# Patient Record
Sex: MALE | Race: WHITE | Employment: UNEMPLOYED | URBAN - METROPOLITAN AREA
[De-identification: names, ages, dates, MRNs, and addresses within clinical notes are randomized per-mention and may not be internally consistent; named-entity substitution may affect disease eponyms.]

---

## 2017-10-23 ENCOUNTER — HOSPITAL ENCOUNTER (OUTPATIENT)
Dept: PHYSICAL THERAPY | Age: 67
Discharge: HOME | End: 2017-10-23

## 2017-11-03 ENCOUNTER — HOSPITAL ENCOUNTER (OUTPATIENT)
Dept: PHYSICAL THERAPY | Age: 67
Discharge: HOME | End: 2017-11-03
Payer: MEDICARE

## 2017-11-03 PROCEDURE — 97162 PT EVAL MOD COMPLEX 30 MIN: CPT

## 2017-11-03 PROCEDURE — G8979 MOBILITY GOAL STATUS: HCPCS

## 2017-11-03 PROCEDURE — G8978 MOBILITY CURRENT STATUS: HCPCS

## 2017-11-03 NOTE — PROGRESS NOTES
Elias Fitzgerald  : 1950 [facility] [place] [id number] [facility], [address], [place] [address].  [phone]   [phone]          OUTPATIENT ORTHOPAEDIC PHYSICAL THERAPY    NAME/AGE/GENDER: Elias Fitzgerald is a 73 y.o. male    DATE: 11/3/2017                       Ambulatory/Rehab Services H2 Model Falls Risk Assessment    Risk Factor Pts. ·   Confusion/Disorientation/Impulsivity  []    4 ·   Symptomatic Depression  []   2 ·   Altered Elimination  []   1 ·   Dizziness/Vertigo  []   1 ·   Gender (Male)  []   1 ·   Any administered antiepileptics (anticonvulsants):  []   2 ·   Any administered benzodiazepines:  []   1 ·   Visual Impairment (specify):  []   1 ·   Portable Oxygen Use  []   1 ·   Orthostatic ? BP  []   1 ·   History of Recent Falls (within 3 mos.)  []   5     Ability to Rise from Chair (choose one) Pts. ·   Ability to rise in a single movement  []   0 ·   Pushes up, successful in one attempt  []   1 ·   Multiple attempts, but unsuccessful  [x]   3 ·   Unable to rise without assistance  []   4   Total: (5 or greater = High Risk) 3     Falls Prevention Plan:   []                Physical Limitations to Exercise (specify):   []                Mobility Assistance Device (type):   []                Exercise/Equipment Adaptation (specify):    © [facility] [address] [place] [address] States Patent #[id number].  Federal Law prohibits the replication, distribution or use without written permission from [facility], [address] [place] [address]

## 2017-11-03 NOTE — THERAPY EVALUATION
Yg Maffucci  : 1950  [place] / Product Type: Medicare /  [address]  at [place] [address], [facility].  [phone]   [phone]          OUTPATIENT PHYSICAL THERAPY:Initial Assessment 11/3/2017    ICD-10: Treatment Diagnosis:   · Other abnormalities of gait and mobility (R26.89)  · Difficulty in walking, not elsewhere classified (R26.2)  Precautions/Allergies:   Lyrica [pregabalin]; Pcn [penicillins]; and Sulfa (sulfonamide antibiotics)   Fall Risk Score: 3 (? 5 = High Risk)  MD Orders: Evaluate and Treat MEDICAL/REFERRING DIAGNOSIS:  Personal history of transient ischemic attack (TIA), and cerebral infarction without residual deficits [Z86.73]  Other malaise [R53.81]   DATE OF ONSET: CVA:   REFERRING PHYSICIAN: Gypsy Bell MD  RETURN PHYSICIAN APPOINTMENT: TBD     INITIAL ASSESSMENT:  Mr. Lynsey Brizuela presents with decreased mobility, wrist pain, and difficulty with independent mobility secondary to CVA and degenerative joint changes. After discussing with patient, he agreed he would benefit from elevated wheelchair cushion, wrist brace, and changes in positioning within wheelchair to ease transfers. Also discussed with patient and his caregiver about updated power chair tools/settings including seat tilt and elevation to assist with independence with transfers and decrease pressure exerted through left wrist.  Patient agreed to attempt changes for the next 2 weeks and then to return to physical therapy if needed. Please sign this plan of treatment if you concur. Thank you for the opportunity to serve this patient. PROBLEM LIST (Impacting functional limitations):  1. Decreased ADL/Functional Activities  2. Increased Pain  3. Decreased Activity Tolerance INTERVENTIONS PLANNED:  1. Cold  2. Family Education  3. Home Exercise Program (HEP)  4.  Therapeutic Activites   TREATMENT PLAN:  Effective Dates: 11/3/2017 TO 12/3/2017. Frequency/Duration: 1 time every 2 weeks for 4 weeks  GOALS: (Goals have been discussed and agreed upon with patient.)  Discharge Goals: Time Frame: 4 weeks  1. Patient will be independent with all transfers with minimal pain in wrist with sit-to/from-stand. 2. Patient will be independent with home exercise program without exacerbation of symptoms or cueing needed. Rehabilitation Potential For Stated Goals: Good  Regarding Quentin Ayalalis's therapy, I certify that the treatment plan above will be carried out by a therapist or under their direction. Thank you for this referral,  Alvin Camp PT     Referring Physician Signature: Mp Hearn MD              Date                    The information in this section was collected on 11/3/2017 (except where otherwise noted). HISTORY:   History of Present Injury/Illness (Reason for Referral):  Patient reports he had a CVA in 2001. His caregiver reports that he has been very independent with all transfers and modified independent for all other ADLs. His caregiver reports that over the past few months the patient has had more trouble with transfers because of his wrist pain. His caregiver reports that he would like to get back to being more independent and have less wrist pain. Past Medical History/Comorbidities:   Mr. Paige Davidson  has a past medical history of Depression (9/4/2012); Diabetes ([address] DMII (diabetes mellitus, type 2) ([address] (9/4/2012); H/O kidney removal; HLD (hyperlipidemia) (9/4/2012); HTN (hypertension) (9/4/2012); Hypertension; Renal calculus; and Stroke ([address] Mr. Paige Davidson  has a past surgical history that includes other surgical (nephrectomy) and carotid endarterectomy.   Social History/Living Environment:   Home Environment: Private residence  Living Alone: No  Support Systems: Home care staff  Prior Level of Function/Work/Activity:  Independent  Dominant Side:         RIGHT  Personal Factors: Sex:  male        Age:  73 y.o. Current Medications:       Current Outpatient Prescriptions:     pioglitazone (ACTOS) 30 mg tablet, TAKE 1 TABLET BY MOUTH EVERY DAY, Disp: 30 Tab, Rfl: 3    lisinopril (PRINIVIL, ZESTRIL) 2.5 mg tablet, TAKE 1 TABLET BY MOUTH EVERY DAY, Disp: 30 Tab, Rfl: 3    glipiZIDE (GLUCOTROL) 10 mg tablet, TAKE 1 TABLET BY MOUTH EVERY DAY, Disp: 30 Tab, Rfl: 10    traMADol (ULTRAM) 50 mg tablet, Take 50 mg by mouth every six (6) hours as needed for Pain., Disp: , Rfl:     carBAMazepine (TEGRETOL) 200 mg tablet, Take 200 mg by mouth three (3) times daily. , Disp: , Rfl:     pioglitazone (ACTOS) 30 mg tablet, Take 1 Tab by mouth daily. , Disp: 90 Tab, Rfl: 3    sertraline (ZOLOFT) 100 mg tablet, Take 1 Tab by mouth daily. , Disp: 30 Tab, Rfl: 12    atenolol (TENORMIN) 25 mg tablet, Take 1 Tab by mouth daily. , Disp: 30 Tab, Rfl: 12    atorvastatin (LIPITOR) 20 mg tablet, Take 1 Tab by mouth daily. , Disp: 30 Tab, Rfl: 12    metFORMIN (GLUCOPHAGE) 1,000 mg tablet, Take 1 Tab by mouth two (2) times daily (with meals). , Disp: 60 Tab, Rfl: 12    Diabetic Shoes XX, One pair Diagnosis code: 250.00, Disp: 1 Device, Rfl: 0    ranitidine (ZANTAC) 150 mg tablet, Take 1 Tab by mouth two (2) times a day., Disp: 60 Tab, Rfl: 12    levETIRAcetam (KEPPRA) 500 mg tablet, Take 1 Tab by mouth daily. , Disp: 30 Tab, Rfl: 12    peg 400-propylene glycol (SYSTANE) 0.4-0.3 % Drop, Administer  to left eye as needed. , Disp: , Rfl:     chlorpheniramine-dm (ROBITUSSIN COUGH & COLD) 2-15 mg/5 mL Liqd, Take 5 mL by mouth every four (4) hours as needed. , Disp: , Rfl:     saxagliptin (ONGLYZA) 5 mg Tab tablet, Take 1 Tab by mouth daily. , Disp: 35 Tab, Rfl: 0    multivitamin (ONE A DAY) tablet, Take 1 Tab by mouth daily.   , Disp: , Rfl:     clotrimazole (LOTRIMIN) 1 % topical cream, Apply  to affected area two (2) times a day.  , Disp: , Rfl:     omega-3 fatty acids-vitamin e (FISH OIL) 1,000 mg Cap, Take 1 Cap by mouth.  , Disp: , Rfl:     sitaGLIPtin (JANUVIA) 100 mg tablet, Take 100 mg by mouth daily. , Disp: , Rfl:     BACLOFEN 10 mg tablet, Take 10 mg by mouth. 1/2 bid, Disp: , Rfl:     ASPIRIN, BULK,, 81 mg daily. , Disp: , Rfl:    Date Last Reviewed:  11/3/2017    Number of Personal Factors/Comorbidities that affect the Plan of Care: 1-2: MODERATE COMPLEXITY   EXAMINATION:   Observation/Orthostatic Postural Assessment:          Posture Assessment: Rounded shoulders, Forward head, AFO R LE, R UE in flexion contracture   Functional Mobility:         Bed Mobility:     · Rolling: Modified independent  · Supine to Sit: Modified independent  · Sit to Supine: Modified independent  · Scooting: Modified independent         Transfers:     Sit to Stand: Modified independent  Stand to Sit: Modified independent  Stand Pivot Transfers: Modified independent  Bed to Chair: Modified independent  · Lateral Transfers: Modified independent        Strength:          L UE STRENGTH: 3+/5 shoulder flexion, 3+/5 shoulder abduction, 3+/5 shoulder extension, 3+/5 elbow flexion, 3+/5 elbow extension. R UE STRENGTH:  Not tested secondary to sensitivity to touch. L LE STRENGTH:  3+/5 hip flexion, 3+/5 hip abduction, 3+/5 hip adduction, 3+/5 hip extension, 3+/5 knee extension, 3+/5 knee flexion, 3+/5 ankle dorsiflexion, 3+/5 ankle plantarflexion, 3+/5 ankle inversion, 3+/5 ankle eversion. R LE STRENGTH: Not tested secondary to sensitivity to touch. Sensation:         Intact on L UE. Unable to assess on R UE/LE secondary to patient request secondary to ultra-sensitivity causing pain. Body Structures Involved:  1. Nerves  2. Muscles Body Functions Affected:  1. Neuromusculoskeletal  2. Movement Related Activities and Participation Affected:  1. Mobility  2.  Self Care   Number of elements (examined above) that affect the Plan of Care: 4+: HIGH COMPLEXITY   CLINICAL PRESENTATION:   Presentation: Evolving clinical presentation with changing clinical characteristics: MODERATE COMPLEXITY   CLINICAL DECISION MAKING:   Outcome Measure: Tool Used: [address] [facility] [address] [address] AM-PACTM Basic Mobility Inpatient Short Form \"1 Clicks\"  Score:  Initial: 12 Most Recent: X (Date: -- )   Interpretation of Tool:  Represents activities that are increasingly more difficult (i.e. Bed mobility, Transfers, Gait). Score 24 23 22-20 19-15 14-10 9-7 6   Modifier CH CI CJ CK CL CM CN     ? Mobility - Walking and Moving Around:     - CURRENT STATUS: CL - 60%-79% impaired, limited or restricted    - GOAL STATUS: CK - 40%-59% impaired, limited or restricted    - D/C STATUS:  ---------------To be determined---------------      Medical Necessity:  · Patient is expected to demonstrate progress in strength, range of motion and balance to improve safety during daily activities. · Patient demonstrates good rehab potential due to higher previous functional level. · Skilled intervention continues to be required due to decreased mobility. Reason for Services/Other Comments:  · Patient continues to demonstrate capacity to improve overall mobility which will increase independence and increase safety. Use of outcome tool(s) and clinical judgement create a POC that gives a: Questionable prediction of patient's progress: MODERATE COMPLEXITY            TREATMENT:   (In addition to Assessment/Re-Assessment sessions the following treatments were rendered)  Pre-treatment Symptoms/Complaints:  11/3/2017: Patient reports his wrist hurts. Pain: Initial: Pain Intensity 1: 6  Pain Location 1: Wrist  Pain Orientation 1: Left  Post Session:  6/10     THERAPEUTIC EXERCISE: (10 minutes):  Exercises per grid below to improve mobility, strength, balance and coordination. Required minimal visual, verbal and manual cues to promote proper body alignment, promote proper body posture and promote proper body mechanics.   Progressed resistance, range, repetitions and complexity of movement as indicated. Date:  11/3/2017   Activity/Exercise Parameters   Added cushion in wheelchair to elevate seat HEP   Wrist brace for support HEP   Scoot to edge of wheelchair to get feet on floor for transfers HEP      [email or web address] Portal  Treatment/Session Assessment:    · Response to Treatment:  Patient tolerated assessment without complaints of increased pain. Patient verbalized and demonstrated understanding of HEP. · Compliance with Program/Exercises: Will assess as treatment progresses. · Recommendations/Intent for next treatment session: \"Next visit will focus on advancements to more challenging activities\".   Total Treatment Duration:  PT Patient Time In/Time Out  Time In: 1400  Time Out:  [address], PT

## 2018-03-13 NOTE — THERAPY DISCHARGE
Darylene Smock  : 1950  [place] / Product Type: Medicare /  [address]  at [address] [place] [address], [place] [address].  [phone]   [phone]          OUTPATIENT PHYSICAL THERAPY:Discontinuation Summary    ICD-10: Treatment Diagnosis:   · Other abnormalities of gait and mobility (R26.89)  · Difficulty in walking, not elsewhere classified (R26.2)  Precautions/Allergies:   Lyrica [pregabalin]; Pcn [penicillins]; and Sulfa (sulfonamide antibiotics)   Fall Risk Score: 3 (? 5 = High Risk)  MD Orders: Evaluate and Treat MEDICAL/REFERRING DIAGNOSIS:  Personal history of transient ischemic attack (TIA), and cerebral infarction without residual deficits [Z86.73]  Other malaise [R53.81]   DATE OF ONSET: CVA:   REFERRING PHYSICIAN: Janet Bell MD  RETURN PHYSICIAN APPOINTMENT: TBD     ASSESSMENT:  Mr. Mook Caldwell presents with decreased mobility, wrist pain, and difficulty with independent mobility secondary to CVA and degenerative joint changes. After discussing with patient, he agreed he would benefit from elevated wheelchair cushion, wrist brace, and changes in positioning within wheelchair to ease transfers. Also discussed with patient and his caregiver about updated power chair tools/settings including seat tilt and elevation to assist with independence with transfers and decrease pressure exerted through left wrist.  Patient agreed to attempt changes for the next 2 weeks and then to return to physical therapy if needed. Patient did not attend or schedule any additional physical therapy visits secondary to unknown reasons. Patient's therapy will be discontinued at this time. We will be happy to re-assess him with a change in his status and a new order from his doctor. Thank you for the opportunity to serve this patient. PROBLEM LIST (Impacting functional limitations):  1.  Decreased ADL/Functional Activities  2. Increased Pain  3. Decreased Activity Tolerance INTERVENTIONS PLANNED:  1. Cold  2. Family Education  3. Home Exercise Program (HEP)  4. Therapeutic Activites   GOALS: (Goals have been discussed and agreed upon with patient.)  Discharge Goals: Time Frame: 4 weeks  1. Patient will be independent with all transfers with minimal pain in wrist with sit-to/from-stand. 2. Patient will be independent with home exercise program without exacerbation of symptoms or cueing needed. HISTORY:   History of Present Injury/Illness (Reason for Referral):  Patient reports he had a CVA in 2001. His caregiver reports that he has been very independent with all transfers and modified independent for all other ADLs. His caregiver reports that over the past few months the patient has had more trouble with transfers because of his wrist pain. His caregiver reports that he would like to get back to being more independent and have less wrist pain. Past Medical History/Comorbidities:   Mr. Kathleen Huston  has a past medical history of Depression (9/4/2012); Diabetes [address] DMII (diabetes mellitus, type 2) ([facility] (9/4/2012); H/O kidney removal; HLD (hyperlipidemia) (9/4/2012); HTN (hypertension) (9/4/2012); Hypertension; Renal calculus; and Stroke ([facility] Mr. Kathleen Huston  has a past surgical history that includes hx other surgical (nephrectomy) and hx carotid endarterectomy. Social History/Living Environment:   Home Environment: Private residence  Living Alone: No  Support Systems: Home care staff  Prior Level of Function/Work/Activity:  Independent  Dominant Side:         RIGHT  Personal Factors:          Sex:  male        Age:  78 y.o.   Current Medications:    Date Last Reviewed:  3/13/2018    EXAMINATION:   Observation/Orthostatic Postural Assessment:          Posture Assessment: Rounded shoulders, Forward head, AFO R LE, R UE in flexion contracture   Functional Mobility:         Bed Mobility:     · Rolling: Modified independent  · Supine to Sit: Modified independent  · Sit to Supine: Modified independent  · Scooting: Modified independent         Transfers:     Sit to Stand: Modified independent  Stand to Sit: Modified independent  Stand Pivot Transfers: Modified independent  Bed to Chair: Modified independent  · Lateral Transfers: Modified independent      Strength:          L UE STRENGTH: 3+/5 shoulder flexion, 3+/5 shoulder abduction, 3+/5 shoulder extension, 3+/5 elbow flexion, 3+/5 elbow extension. R UE STRENGTH:  Not tested secondary to sensitivity to touch. L LE STRENGTH:  3+/5 hip flexion, 3+/5 hip abduction, 3+/5 hip adduction, 3+/5 hip extension, 3+/5 knee extension, 3+/5 knee flexion, 3+/5 ankle dorsiflexion, 3+/5 ankle plantarflexion, 3+/5 ankle inversion, 3+/5 ankle eversion. R LE STRENGTH: Not tested secondary to sensitivity to touch. Sensation:         Intact on L UE. Unable to assess on R UE/LE secondary to patient request secondary to ultra-sensitivity causing pain. CLINICAL DECISION MAKING:   Outcome Measure: Tool Used: [address] [place] [address] [address] [place] [address]-[facility] Basic Mobility Inpatient Short Form \"6 Clicks\"  Score:  Initial: 12 Most Recent: X (Date: -- )   Interpretation of Tool:  Represents activities that are increasingly more difficult (i.e. Bed mobility, Transfers, Gait). Score 24 23 22-20 19-15 14-10 9-7 6   Modifier CH CI CJ CK CL CM CN     ?  Mobility - Walking and Moving Around:     - CURRENT STATUS: CL - 60%-79% impaired, limited or restricted    - GOAL STATUS: CK - 40%-59% impaired, limited or restricted    - D/C STATUS:  ---------------To be determined---------------        Mariluz Kimbrough, PT

## 2024-07-25 ENCOUNTER — HOSPITAL ENCOUNTER (EMERGENCY)
Age: 74
Discharge: HOME | End: 2024-07-25
Attending: EMERGENCY MEDICINE
Payer: MEDICARE

## 2024-07-25 ENCOUNTER — APPOINTMENT (OUTPATIENT)
Dept: GENERAL RADIOLOGY | Age: 74
End: 2024-07-25
Payer: MEDICARE

## 2024-07-25 VITALS
SYSTOLIC BLOOD PRESSURE: 119 MMHG | BODY MASS INDEX: 27.47 KG/M2 | WEIGHT: 175 LBS | HEART RATE: 84 BPM | RESPIRATION RATE: 19 BRPM | HEIGHT: 67 IN | DIASTOLIC BLOOD PRESSURE: 60 MMHG | OXYGEN SATURATION: 93 % | TEMPERATURE: 98.6 F

## 2024-07-25 DIAGNOSIS — R56.9 SEIZURE (HCC): ICD-10-CM

## 2024-07-25 DIAGNOSIS — R50.9 FEVER, UNSPECIFIED FEVER CAUSE: Primary | ICD-10-CM

## 2024-07-25 LAB
ALBUMIN SERPL-MCNC: 4.4 G/DL (ref 3.2–4.6)
ALBUMIN/GLOB SERPL: 1.5 (ref 1–1.9)
ALP SERPL-CCNC: 102 U/L (ref 40–129)
ALT SERPL-CCNC: 18 U/L (ref 12–65)
ANION GAP SERPL CALC-SCNC: 14 MMOL/L (ref 9–18)
APPEARANCE UR: CLEAR
AST SERPL-CCNC: 20 U/L (ref 15–37)
BACTERIA URNS QL MICRO: NEGATIVE /HPF
BASOPHILS # BLD: 0 K/UL (ref 0–0.2)
BASOPHILS NFR BLD: 0 % (ref 0–2)
BILIRUB SERPL-MCNC: 1 MG/DL (ref 0–1.2)
BILIRUB UR QL: NEGATIVE
BUN SERPL-MCNC: 22 MG/DL (ref 8–23)
CALCIUM SERPL-MCNC: 10.1 MG/DL (ref 8.8–10.2)
CHLORIDE SERPL-SCNC: 99 MMOL/L (ref 98–107)
CO2 SERPL-SCNC: 25 MMOL/L (ref 20–28)
COLOR UR: NORMAL
CREAT SERPL-MCNC: 1.1 MG/DL (ref 0.8–1.3)
DIFFERENTIAL METHOD BLD: ABNORMAL
EOSINOPHIL # BLD: 0 K/UL (ref 0–0.8)
EOSINOPHIL NFR BLD: 0 % (ref 0.5–7.8)
EPI CELLS #/AREA URNS HPF: NORMAL /HPF
ERYTHROCYTE [DISTWIDTH] IN BLOOD BY AUTOMATED COUNT: 13.8 % (ref 11.9–14.6)
FLUAV RNA SPEC QL NAA+PROBE: NOT DETECTED
FLUBV RNA SPEC QL NAA+PROBE: NOT DETECTED
GLOBULIN SER CALC-MCNC: 3 G/DL (ref 2.3–3.5)
GLUCOSE SERPL-MCNC: 177 MG/DL (ref 70–99)
GLUCOSE UR STRIP.AUTO-MCNC: >1000 MG/DL
HCT VFR BLD AUTO: 45.3 % (ref 41.1–50.3)
HGB BLD-MCNC: 14.7 G/DL (ref 13.6–17.2)
HGB UR QL STRIP: NEGATIVE
HYALINE CASTS URNS QL MICRO: NORMAL /LPF
IMM GRANULOCYTES # BLD AUTO: 0.1 K/UL (ref 0–0.5)
IMM GRANULOCYTES NFR BLD AUTO: 1 % (ref 0–5)
KETONES UR QL STRIP.AUTO: NEGATIVE MG/DL
LACTATE SERPL-SCNC: 1.3 MMOL/L (ref 0.5–2)
LACTATE SERPL-SCNC: 2 MMOL/L (ref 0.5–2)
LEUKOCYTE ESTERASE UR QL STRIP.AUTO: NEGATIVE
LYMPHOCYTES # BLD: 0.9 K/UL (ref 0.5–4.6)
LYMPHOCYTES NFR BLD: 9 % (ref 13–44)
MCH RBC QN AUTO: 30.1 PG (ref 26.1–32.9)
MCHC RBC AUTO-ENTMCNC: 32.5 G/DL (ref 31.4–35)
MCV RBC AUTO: 92.8 FL (ref 82–102)
MONOCYTES # BLD: 0.6 K/UL (ref 0.1–1.3)
MONOCYTES NFR BLD: 6 % (ref 4–12)
MUCOUS THREADS URNS QL MICRO: 0 /LPF
NEUTS SEG # BLD: 8.2 K/UL (ref 1.7–8.2)
NEUTS SEG NFR BLD: 83 % (ref 43–78)
NITRITE UR QL STRIP.AUTO: NEGATIVE
NRBC # BLD: 0 K/UL (ref 0–0.2)
PH UR STRIP: 5.5 (ref 5–9)
PLATELET # BLD AUTO: 173 K/UL (ref 150–450)
PMV BLD AUTO: 10.8 FL (ref 9.4–12.3)
POTASSIUM SERPL-SCNC: 4.6 MMOL/L (ref 3.5–5.1)
PROCALCITONIN SERPL-MCNC: 0.19 NG/ML (ref 0–0.1)
PROT SERPL-MCNC: 7.4 G/DL (ref 6.3–8.2)
PROT UR STRIP-MCNC: NEGATIVE MG/DL
RBC # BLD AUTO: 4.88 M/UL (ref 4.23–5.6)
RBC #/AREA URNS HPF: NORMAL /HPF
SARS-COV-2 RDRP RESP QL NAA+PROBE: NOT DETECTED
SODIUM SERPL-SCNC: 138 MMOL/L (ref 136–145)
SOURCE: NORMAL
SP GR UR REFRACTOMETRY: 1.02 (ref 1–1.02)
TROPONIN T SERPL HS-MCNC: 13 NG/L (ref 0–22)
TROPONIN T SERPL HS-MCNC: 19 NG/L (ref 0–22)
URINE CULTURE IF INDICATED: NORMAL
UROBILINOGEN UR QL STRIP.AUTO: 0.2 EU/DL (ref 0.2–1)
WBC # BLD AUTO: 9.9 K/UL (ref 4.3–11.1)
WBC URNS QL MICRO: NORMAL /HPF

## 2024-07-25 PROCEDURE — 87040 BLOOD CULTURE FOR BACTERIA: CPT

## 2024-07-25 PROCEDURE — 80177 DRUG SCRN QUAN LEVETIRACETAM: CPT

## 2024-07-25 PROCEDURE — 81001 URINALYSIS AUTO W/SCOPE: CPT

## 2024-07-25 PROCEDURE — 85025 COMPLETE CBC W/AUTO DIFF WBC: CPT

## 2024-07-25 PROCEDURE — 71046 X-RAY EXAM CHEST 2 VIEWS: CPT

## 2024-07-25 PROCEDURE — 83605 ASSAY OF LACTIC ACID: CPT

## 2024-07-25 PROCEDURE — 93005 ELECTROCARDIOGRAM TRACING: CPT | Performed by: EMERGENCY MEDICINE

## 2024-07-25 PROCEDURE — 87502 INFLUENZA DNA AMP PROBE: CPT

## 2024-07-25 PROCEDURE — 84145 PROCALCITONIN (PCT): CPT

## 2024-07-25 PROCEDURE — 99285 EMERGENCY DEPT VISIT HI MDM: CPT

## 2024-07-25 PROCEDURE — 87635 SARS-COV-2 COVID-19 AMP PRB: CPT

## 2024-07-25 PROCEDURE — 84484 ASSAY OF TROPONIN QUANT: CPT

## 2024-07-25 PROCEDURE — 80053 COMPREHEN METABOLIC PANEL: CPT

## 2024-07-25 RX ORDER — MEMANTINE HYDROCHLORIDE 10 MG/1
10 TABLET ORAL 2 TIMES DAILY
COMMUNITY

## 2024-07-25 RX ORDER — ASPIRIN 81 MG/1
81 TABLET ORAL DAILY
COMMUNITY

## 2024-07-25 RX ORDER — BACLOFEN 20 MG/1
20 TABLET ORAL 3 TIMES DAILY
COMMUNITY

## 2024-07-25 RX ORDER — LEVETIRACETAM 250 MG/1
125 TABLET ORAL DAILY
COMMUNITY

## 2024-07-25 RX ORDER — OMEPRAZOLE 20 MG/1
40 CAPSULE, DELAYED RELEASE ORAL DAILY
COMMUNITY

## 2024-07-25 RX ORDER — PIOGLITAZONEHYDROCHLORIDE 45 MG/1
45 TABLET ORAL DAILY
COMMUNITY

## 2024-07-25 RX ORDER — CHOLECALCIFEROL (VITAMIN D3) 1250 MCG
CAPSULE ORAL
COMMUNITY

## 2024-07-25 RX ORDER — LEVETIRACETAM 500 MG/1
500 TABLET ORAL NIGHTLY
COMMUNITY

## 2024-07-25 RX ORDER — ATORVASTATIN CALCIUM 40 MG/1
40 TABLET, FILM COATED ORAL NIGHTLY
COMMUNITY

## 2024-07-25 RX ORDER — LISINOPRIL 2.5 MG/1
2.5 TABLET ORAL DAILY
COMMUNITY

## 2024-07-25 RX ORDER — TOLTERODINE 4 MG/1
4 CAPSULE, EXTENDED RELEASE ORAL DAILY
COMMUNITY

## 2024-07-25 ASSESSMENT — PAIN - FUNCTIONAL ASSESSMENT: PAIN_FUNCTIONAL_ASSESSMENT: NONE - DENIES PAIN

## 2024-07-25 ASSESSMENT — LIFESTYLE VARIABLES
HOW MANY STANDARD DRINKS CONTAINING ALCOHOL DO YOU HAVE ON A TYPICAL DAY: PATIENT DOES NOT DRINK
HOW OFTEN DO YOU HAVE A DRINK CONTAINING ALCOHOL: NEVER

## 2024-07-25 NOTE — ED NOTES
Pt became non responsive to caregiver.  Pt became cold and suddenly had increased diaphoresis.  Pt turned to nurse when name called.  MD Skinner notified.  Primary RN Debo at bedside.

## 2024-07-25 NOTE — ED TRIAGE NOTES
Pt arrives via EMS from home with feverish for a couple of days.  Per EMS, caregiver has given tylenol, EMS unsure of how much. Pt is afebrile on arrival.    Hx stroke, aphasia, dementia, wc and/or bed bound.

## 2024-07-25 NOTE — ED NOTES
I have reviewed discharge instructions with the caregiver.  The caregiver verbalized understanding.    Patient left ED via Discharge Method: stretcher to Home with  Caregiver and [facility] .    Opportunity for questions and clarification provided.     Patient given 0 scripts.     ;

## 2024-07-25 NOTE — DISCHARGE INSTRUCTIONS
Extra dose of your Keppra 250 mg at home and take regular nighttime dose    Follow for fever or any progressive infectious symptoms.    Levels of your seizure medicine should be arriving in your MyChart over the next day or 2.  This may require adjustment if it is subtherapeutic or out of normal range    Close follow going home for any changes and return with any concerns

## 2024-07-26 LAB
EKG ATRIAL RATE: 64 BPM
EKG DIAGNOSIS: NORMAL
EKG P AXIS: 69 DEGREES
EKG P-R INTERVAL: 162 MS
EKG Q-T INTERVAL: 454 MS
EKG QRS DURATION: 91 MS
EKG QTC CALCULATION (BAZETT): 469 MS
EKG R AXIS: 43 DEGREES
EKG T AXIS: 63 DEGREES
EKG VENTRICULAR RATE: 64 BPM

## 2024-07-26 PROCEDURE — 93010 ELECTROCARDIOGRAM REPORT: CPT | Performed by: INTERNAL MEDICINE

## 2024-07-27 LAB — LEVETIRACETAM SERPL-MCNC: 6.1 UG/ML (ref 10–40)

## 2024-07-28 LAB
BACTERIA SPEC CULT: NORMAL
BACTERIA SPEC CULT: NORMAL
SERVICE CMNT-IMP: NORMAL
SERVICE CMNT-IMP: NORMAL

## 2024-07-30 ASSESSMENT — ENCOUNTER SYMPTOMS
VOMITING: 0
WHEEZING: 0
ANAL BLEEDING: 0
TROUBLE SWALLOWING: 0
COUGH: 0
NAUSEA: 0
DIARRHEA: 0
ABDOMINAL PAIN: 0
SORE THROAT: 0
BLOOD IN STOOL: 0

## 2024-07-30 NOTE — ED PROVIDER NOTES
AM           XR CHEST (2 VW)   Final Result   No acute findings in the chest         Electronically signed by Pedro Moreno                   No results for input(s): \"COVID19\" in the last 72 hours.    Voice dictation software was used during the making of this note.  This software is not perfect and grammatical and other typographical errors may be present.  This note has not been completely proofread for errors.      Kelby Keen MD  07/30/24 2008
